# Patient Record
Sex: FEMALE | Race: WHITE | NOT HISPANIC OR LATINO | Employment: FULL TIME | URBAN - METROPOLITAN AREA
[De-identification: names, ages, dates, MRNs, and addresses within clinical notes are randomized per-mention and may not be internally consistent; named-entity substitution may affect disease eponyms.]

---

## 2018-12-17 ENCOUNTER — HISTORICAL (OUTPATIENT)
Dept: ADMINISTRATIVE | Facility: HOSPITAL | Age: 33
End: 2018-12-17

## 2018-12-17 LAB
ABS NEUT (OLG): 6.85 X10(3)/MCL (ref 2.1–9.2)
ALBUMIN SERPL-MCNC: 3.9 GM/DL (ref 3.4–5)
ALBUMIN/GLOB SERPL: 1.1 {RATIO}
ALP SERPL-CCNC: 89 UNIT/L (ref 38–126)
ALT SERPL-CCNC: 17 UNIT/L (ref 12–78)
APTT PPP: 30.9 SECOND(S) (ref 24.8–36.9)
AST SERPL-CCNC: 17 UNIT/L (ref 15–37)
BASOPHILS # BLD AUTO: 0 X10(3)/MCL (ref 0–0.2)
BASOPHILS NFR BLD AUTO: 0.2 %
BILIRUB SERPL-MCNC: 0.8 MG/DL (ref 0.2–1)
BILIRUBIN DIRECT+TOT PNL SERPL-MCNC: 0.2 MG/DL (ref 0–0.2)
BILIRUBIN DIRECT+TOT PNL SERPL-MCNC: 0.6 MG/DL (ref 0–0.8)
BUN SERPL-MCNC: 15 MG/DL (ref 7–18)
CALCIUM SERPL-MCNC: 9.2 MG/DL (ref 8.5–10.1)
CHLORIDE SERPL-SCNC: 103 MMOL/L (ref 98–107)
CO2 SERPL-SCNC: 25 MMOL/L (ref 21–32)
CREAT SERPL-MCNC: 0.84 MG/DL (ref 0.55–1.02)
EOSINOPHIL # BLD AUTO: 0.1 X10(3)/MCL (ref 0–0.9)
EOSINOPHIL NFR BLD AUTO: 0.6 %
ERYTHROCYTE [DISTWIDTH] IN BLOOD BY AUTOMATED COUNT: 14 % (ref 11.5–17)
GLOBULIN SER-MCNC: 3.5 GM/DL (ref 2.4–3.5)
GLUCOSE SERPL-MCNC: 85 MG/DL (ref 74–106)
HCT VFR BLD AUTO: 46.6 % (ref 37–47)
HGB BLD-MCNC: 14.7 GM/DL (ref 12–16)
INR PPP: 1 (ref 0–1.3)
LYMPHOCYTES # BLD AUTO: 2.5 X10(3)/MCL (ref 0.6–4.6)
LYMPHOCYTES NFR BLD AUTO: 24.9 %
MCH RBC QN AUTO: 28.6 PG (ref 27–31)
MCHC RBC AUTO-ENTMCNC: 31.5 GM/DL (ref 33–36)
MCV RBC AUTO: 90.7 FL (ref 80–94)
MONOCYTES # BLD AUTO: 0.6 X10(3)/MCL (ref 0.1–1.3)
MONOCYTES NFR BLD AUTO: 6.3 %
NEUTROPHILS # BLD AUTO: 6.8 X10(3)/MCL (ref 2.1–9.2)
NEUTROPHILS NFR BLD AUTO: 67.9 %
PLATELET # BLD AUTO: 270 X10(3)/MCL (ref 130–400)
PMV BLD AUTO: 10.5 FL (ref 9.4–12.4)
POTASSIUM SERPL-SCNC: 4.6 MMOL/L (ref 3.5–5.1)
PROT SERPL-MCNC: 7.4 GM/DL (ref 6.4–8.2)
PROTHROMBIN TIME: 13.4 SECOND(S) (ref 12.2–14.7)
RBC # BLD AUTO: 5.14 X10(6)/MCL (ref 4.2–5.4)
SODIUM SERPL-SCNC: 137 MMOL/L (ref 136–145)
WBC # SPEC AUTO: 10.1 X10(3)/MCL (ref 4.5–11.5)

## 2021-08-18 ENCOUNTER — HISTORICAL (OUTPATIENT)
Dept: ADMINISTRATIVE | Facility: HOSPITAL | Age: 36
End: 2021-08-18

## 2021-08-18 LAB
ABS NEUT (OLG): 5.1 X10(3)/MCL (ref 2.1–9.2)
ALBUMIN SERPL-MCNC: 4.5 GM/DL (ref 3.4–5)
ALBUMIN/GLOB SERPL: 1.8 {RATIO} (ref 1.5–2.5)
ALP SERPL-CCNC: 70 UNIT/L (ref 38–126)
ALT SERPL-CCNC: 18 UNIT/L (ref 7–52)
APPEARANCE, UA: CLEAR
AST SERPL-CCNC: 19 UNIT/L (ref 15–37)
BACTERIA #/AREA URNS AUTO: ABNORMAL /HPF
BILIRUB SERPL-MCNC: 0.6 MG/DL (ref 0.2–1)
BILIRUB UR QL STRIP: NEGATIVE MG/DL
BILIRUBIN DIRECT+TOT PNL SERPL-MCNC: 0.1 MG/DL (ref 0–0.5)
BILIRUBIN DIRECT+TOT PNL SERPL-MCNC: 0.5 MG/DL
BUN SERPL-MCNC: 18 MG/DL (ref 7–18)
CALCIUM SERPL-MCNC: 9.4 MG/DL (ref 8.5–10)
CHLORIDE SERPL-SCNC: 100 MMOL/L (ref 98–107)
CHOLEST SERPL-MCNC: 231 MG/DL (ref 0–200)
CHOLEST/HDLC SERPL: 2.6 {RATIO}
CO2 SERPL-SCNC: 26 MMOL/L (ref 21–32)
COLOR UR: YELLOW
CREAT SERPL-MCNC: 0.67 MG/DL (ref 0.6–1.3)
ERYTHROCYTE [DISTWIDTH] IN BLOOD BY AUTOMATED COUNT: 14.9 % (ref 11.5–17)
GLOBULIN SER-MCNC: 2.5 GM/DL (ref 1.2–3)
GLUCOSE (UA): NEGATIVE MG/DL
GLUCOSE SERPL-MCNC: 91 MG/DL (ref 74–106)
HCT VFR BLD AUTO: 41.5 % (ref 37–47)
HDLC SERPL-MCNC: 89 MG/DL (ref 35–60)
HGB BLD-MCNC: 13.5 GM/DL (ref 12–16)
HGB UR QL STRIP: ABNORMAL UNIT/L
KETONES UR QL STRIP: ABNORMAL MG/DL
LDLC SERPL CALC-MCNC: 99 MG/DL (ref 0–129)
LEUKOCYTE ESTERASE UR QL STRIP: NEGATIVE UNIT/L
LYMPHOCYTES # BLD AUTO: 1.8 X10(3)/MCL (ref 0.6–3.4)
LYMPHOCYTES NFR BLD AUTO: 23.2 % (ref 13–40)
MCH RBC QN AUTO: 29.8 PG (ref 27–31.2)
MCHC RBC AUTO-ENTMCNC: 32 GM/DL (ref 32–36)
MCV RBC AUTO: 92 FL (ref 80–94)
MONOCYTES # BLD AUTO: 0.8 X10(3)/MCL (ref 0.1–1.3)
MONOCYTES NFR BLD AUTO: 10.7 % (ref 0.1–24)
NEUTROPHILS NFR BLD AUTO: 66.1 % (ref 47–80)
NITRITE UR QL STRIP.AUTO: NEGATIVE
PH UR STRIP: 5.5 [PH]
PLATELET # BLD AUTO: 281 X10(3)/MCL (ref 130–400)
PMV BLD AUTO: 10 FL (ref 9.4–12.4)
POTASSIUM SERPL-SCNC: 4.2 MMOL/L (ref 3.5–5.1)
PROT SERPL-MCNC: 7 GM/DL (ref 6.4–8.2)
PROT UR QL STRIP: NEGATIVE MG/DL
RBC # BLD AUTO: 4.53 X10(6)/MCL (ref 4.2–5.4)
RBC #/AREA URNS HPF: ABNORMAL /HPF
SODIUM SERPL-SCNC: 139 MMOL/L (ref 136–145)
SP GR UR STRIP: >1.03
SQUAMOUS EPITHELIAL, UA: ABNORMAL /LPF
TRIGL SERPL-MCNC: 62 MG/DL (ref 30–150)
UROBILINOGEN UR STRIP-ACNC: 1 MG/DL
VLDLC SERPL CALC-MCNC: 12.4 MG/DL
WBC # SPEC AUTO: 7.7 X10(3)/MCL (ref 4.5–11.5)
WBC #/AREA URNS AUTO: ABNORMAL /[HPF]

## 2021-08-19 ENCOUNTER — HISTORICAL (OUTPATIENT)
Dept: ADMINISTRATIVE | Facility: HOSPITAL | Age: 36
End: 2021-08-19

## 2021-08-19 LAB — SARS-COV-2 RNA RESP QL NAA+PROBE: POSITIVE

## 2021-08-21 LAB — FINAL CULTURE: NORMAL

## 2022-04-10 ENCOUNTER — HISTORICAL (OUTPATIENT)
Dept: ADMINISTRATIVE | Facility: HOSPITAL | Age: 37
End: 2022-04-10

## 2022-04-11 ENCOUNTER — HISTORICAL (OUTPATIENT)
Dept: ADMINISTRATIVE | Facility: HOSPITAL | Age: 37
End: 2022-04-11

## 2022-04-28 VITALS
OXYGEN SATURATION: 99 % | WEIGHT: 180.75 LBS | DIASTOLIC BLOOD PRESSURE: 88 MMHG | HEIGHT: 66 IN | SYSTOLIC BLOOD PRESSURE: 134 MMHG | BODY MASS INDEX: 29.05 KG/M2

## 2022-04-28 VITALS
WEIGHT: 180.75 LBS | DIASTOLIC BLOOD PRESSURE: 88 MMHG | HEIGHT: 66 IN | SYSTOLIC BLOOD PRESSURE: 134 MMHG | OXYGEN SATURATION: 99 % | BODY MASS INDEX: 29.05 KG/M2

## 2023-05-09 PROBLEM — Z00.00 WELLNESS EXAMINATION: Status: ACTIVE | Noted: 2023-05-09

## 2023-05-10 PROBLEM — E78.5 HYPERLIPIDEMIA: Status: ACTIVE | Noted: 2023-05-10

## 2023-08-14 PROBLEM — Z00.00 WELLNESS EXAMINATION: Status: RESOLVED | Noted: 2023-05-09 | Resolved: 2023-08-14

## 2024-02-19 ENCOUNTER — TELEPHONE (OUTPATIENT)
Dept: PRIMARY CARE CLINIC | Facility: CLINIC | Age: 39
End: 2024-02-19

## 2024-02-19 DIAGNOSIS — Z00.00 WELLNESS EXAMINATION: ICD-10-CM

## 2024-02-19 DIAGNOSIS — E78.5 HYPERLIPIDEMIA, UNSPECIFIED HYPERLIPIDEMIA TYPE: Primary | ICD-10-CM

## 2024-02-19 NOTE — TELEPHONE ENCOUNTER
----- Message from Mali Lawson sent at 2/19/2024 11:02 AM CST -----  .Caller is requesting to schedule their Lab appointment prior to annual appointment.  Order is not listed in EPIC.  Please enter order and contact patient to schedule.    Name of Caller: pt      Preferred Date and Time of Labs: 5-10-24    Date of EPP Appointment: 5-22-24    Where would they like the lab performed? [facility]     Would the patient rather a call back or a response via [facility]?  Seun      Best Call Back Number: [phone]    Additional Information: please put labs in system thanks

## 2024-05-21 ENCOUNTER — LAB VISIT (OUTPATIENT)
Dept: LAB | Facility: HOSPITAL | Age: 39
End: 2024-05-21
Attending: FAMILY MEDICINE
Payer: COMMERCIAL

## 2024-05-21 DIAGNOSIS — E78.5 HYPERLIPIDEMIA, UNSPECIFIED HYPERLIPIDEMIA TYPE: ICD-10-CM

## 2024-05-21 DIAGNOSIS — Z00.00 WELLNESS EXAMINATION: ICD-10-CM

## 2024-05-21 LAB
ALBUMIN SERPL-MCNC: 3.8 G/DL (ref 3.5–5)
ALBUMIN/GLOB SERPL: 1.4 RATIO (ref 1.1–2)
ALP SERPL-CCNC: 76 UNIT/L (ref 40–150)
ALT SERPL-CCNC: 13 UNIT/L (ref 0–55)
ANION GAP SERPL CALC-SCNC: 9 MEQ/L
AST SERPL-CCNC: 16 UNIT/L (ref 5–34)
BASOPHILS # BLD AUTO: 0.03 X10(3)/MCL
BASOPHILS NFR BLD AUTO: 0.5 %
BILIRUB SERPL-MCNC: 0.7 MG/DL
BUN SERPL-MCNC: 17.3 MG/DL (ref 7–18.7)
CALCIUM SERPL-MCNC: 8.8 MG/DL (ref 8.4–10.2)
CHLORIDE SERPL-SCNC: 105 MMOL/L (ref 98–107)
CHOLEST SERPL-MCNC: 206 MG/DL
CHOLEST/HDLC SERPL: 3 {RATIO} (ref 0–5)
CO2 SERPL-SCNC: 25 MMOL/L (ref 22–29)
CREAT SERPL-MCNC: 0.77 MG/DL (ref 0.55–1.02)
CREAT/UREA NIT SERPL: 22
EOSINOPHIL # BLD AUTO: 0.09 X10(3)/MCL (ref 0–0.9)
EOSINOPHIL NFR BLD AUTO: 1.5 %
ERYTHROCYTE [DISTWIDTH] IN BLOOD BY AUTOMATED COUNT: 14.6 % (ref 11.5–17)
EST. AVERAGE GLUCOSE BLD GHB EST-MCNC: 93.9 MG/DL
GFR SERPLBLD CREATININE-BSD FMLA CKD-EPI: >60 ML/MIN/1.73/M2
GLOBULIN SER-MCNC: 2.8 GM/DL (ref 2.4–3.5)
GLUCOSE SERPL-MCNC: 90 MG/DL (ref 74–100)
HBA1C MFR BLD: 4.9 %
HCT VFR BLD AUTO: 39.9 % (ref 37–47)
HDLC SERPL-MCNC: 73 MG/DL (ref 35–60)
HGB BLD-MCNC: 12.9 G/DL (ref 12–16)
IMM GRANULOCYTES # BLD AUTO: 0.01 X10(3)/MCL (ref 0–0.04)
IMM GRANULOCYTES NFR BLD AUTO: 0.2 %
LDLC SERPL CALC-MCNC: 116 MG/DL (ref 50–140)
LYMPHOCYTES # BLD AUTO: 1.91 X10(3)/MCL (ref 0.6–4.6)
LYMPHOCYTES NFR BLD AUTO: 31.9 %
MCH RBC QN AUTO: 30.2 PG (ref 27–31)
MCHC RBC AUTO-ENTMCNC: 32.3 G/DL (ref 33–36)
MCV RBC AUTO: 93.4 FL (ref 80–94)
MONOCYTES # BLD AUTO: 0.51 X10(3)/MCL (ref 0.1–1.3)
MONOCYTES NFR BLD AUTO: 8.5 %
NEUTROPHILS # BLD AUTO: 3.43 X10(3)/MCL (ref 2.1–9.2)
NEUTROPHILS NFR BLD AUTO: 57.4 %
NRBC BLD AUTO-RTO: 0 %
PLATELET # BLD AUTO: 317 X10(3)/MCL (ref 130–400)
PMV BLD AUTO: 11.1 FL (ref 7.4–10.4)
POTASSIUM SERPL-SCNC: 4.4 MMOL/L (ref 3.5–5.1)
PROT SERPL-MCNC: 6.6 GM/DL (ref 6.4–8.3)
RBC # BLD AUTO: 4.27 X10(6)/MCL (ref 4.2–5.4)
SODIUM SERPL-SCNC: 139 MMOL/L (ref 136–145)
TRIGL SERPL-MCNC: 83 MG/DL (ref 37–140)
TSH SERPL-ACNC: 1.48 UIU/ML (ref 0.35–4.94)
VLDLC SERPL CALC-MCNC: 17 MG/DL
WBC # SPEC AUTO: 5.98 X10(3)/MCL (ref 4.5–11.5)

## 2024-05-21 PROCEDURE — 80053 COMPREHEN METABOLIC PANEL: CPT

## 2024-05-21 PROCEDURE — 83036 HEMOGLOBIN GLYCOSYLATED A1C: CPT

## 2024-05-21 PROCEDURE — 85025 COMPLETE CBC W/AUTO DIFF WBC: CPT

## 2024-05-21 PROCEDURE — 80061 LIPID PANEL: CPT

## 2024-05-21 PROCEDURE — 36415 COLL VENOUS BLD VENIPUNCTURE: CPT

## 2024-05-21 PROCEDURE — 84443 ASSAY THYROID STIM HORMONE: CPT

## 2024-05-22 PROBLEM — E78.49 OTHER HYPERLIPIDEMIA: Status: ACTIVE | Noted: 2023-05-10

## 2024-05-22 NOTE — PROGRESS NOTES
..Annual Exam (Wants to speak about losing weight)       HISTORY OF PRESENT ILLNESS    This 38 y.o. female patient presents to the clinic today for a wellness CPX.  She has been feeling well.  She has not been sleeping well secondary to 2 year old not sleeping well.   Appetite is normal.   She is an  at [employer].  She is  with 3 children.  She has alcohol 2-3 times a week.  No tobacco.   She has up to 1-2 cups of coffee and 1 diet soda a day.  She walks around the neighborhood 2-3 times a week.  GYN: Velma MANCILLA; last checkup 09/19/2023.    The patient's Health Maintenance was reviewed and the following appears to be due at this time:   Health Maintenance Due   Topic Date Due    Hepatitis C Screening  Never done    HIV Screening  Never done    COVID-19 Vaccine (2 - 2023-24 season) 09/01/2023       ..History reviewed. No pertinent past medical history.       ..  Past Surgical History:   Procedure Laterality Date    DILATION AND CURETTAGE OF UTERUS  2013          No current outpatient medications        ..  Social History     Socioeconomic History    Marital status:     Number of children: 3   Occupational History    Occupation:  at [employer]   Tobacco Use    Smoking status: Never     Passive exposure: Never    Smokeless tobacco: Never   Substance and Sexual Activity    Alcohol use: Yes     Comment: 2-3 times per week    Drug use: Never    Sexual activity: Yes     Partners: Male     Social Determinants of Health     Financial Resource Strain: Low Risk  (5/24/2024)    Overall Financial Resource Strain (CARDIA)     Difficulty of Paying Living Expenses: Not hard at all   Food Insecurity: No Food Insecurity (5/24/2024)    Hunger Vital Sign     Worried About Running Out of Food in the Last Year: Never true     Ran Out of Food in the Last Year: Never true   Transportation Needs: No Transportation Needs (5/24/2024)    TRANSPORTATION NEEDS      Transportation : No   Physical Activity: Insufficiently Active (5/24/2024)    Exercise Vital Sign     Days of Exercise per Week: 2 days     Minutes of Exercise per Session: 30 min   Stress: No Stress Concern Present (5/24/2024)    [employer] - Occupational Stress Questionnaire     Feeling of Stress : Only a little   Housing Stability: Low Risk  (5/24/2024)    Housing Stability Vital Sign     Unable to Pay for Housing in the Last Year: No     Homeless in the Last Year: No          ..  Family History   Problem Relation Name Age of Onset    Cancer Mother      Heart disease Mother      Hypertension Mother      Hyperlipidemia Mother      Diabetes Mother      Colon cancer Mother  68    Hypertension Father      Hyperlipidemia Father      Diabetes Father      Cancer Sister      Breast cancer Sister  42    Lupus Sister      No Known Problems Brother      No Known Problems Brother      Cancer Brother  27        Sinus Cancer          ..  Review of patient's allergies indicates:   Allergen Reactions    Ceclor [cefaclor] Rash     Taken as a toddler          ..  Immunization History   Administered Date(s) Administered    COVID-19, vector-nr, rS-Ad26, PF ([employer]) 03/19/2021    Tdap 01/14/2015, 01/01/2021          REVIEW OF SYSTEMS:    GENERAL:   no unexplained wt gain/loss,  fever, + fatigue, chills, night sweats or weakness  HEENT: no sore throat, ear pain, sinus pressure, nasal congestion, or rhinorrhea  VISION: no vision changes, glaucoma, cataracts, + glasses, rarely wears contacts  CARDIAC: no chest pain, palpitations, dyspnea on exertion, orthopnea  RESPIRATORY: no cough, wheezing, sputum production, or SOB  GI: no abdominal pain, n&v, + heartburn, 3-4 x week, does not eat late, takes TUMS, + constipation x 2 weeks, takes a stool softener or a laxative, diarrhea,  blood in stool or  (+)family history of colon cancer:  mother, maternal grandfather  : no dysuria, hematuria, frequency,  urgency,  "incontinence,  vaginal discharge,  abnormal vaginal bleeding  MUSC/SKEL:  no myalgia, weakness, edema,  arthralgia, or joint effusion  SKIN:  + rashes on face, hives, itching or sores  NEURO:  No headaches, numbness,  tingling, weakness, or dizziness  PSYCH:  + anxiety,  depression,  irritability,  suicidal ideation or hallucinations  ENDO:  No polyuria, polydipsia,  polyphagia  HEME:  No bruising,  lymphadenopathy, bleeding disorders, no anemia       PHYSICAL EXAM:    Visit Vitals  /88   Pulse 94   Temp 98.5 °F (36.9 °C)   Ht 5' 6" (1.676 m)   Wt 79.3 kg (174 lb 12.8 oz)   LMP 05/17/2024 (Exact Date)   SpO2 98%   BMI 28.21 kg/m²       GENERAL:  Well-developed well-nourished white female in NAD, alert and oriented x 3  SKIN:  no rash or abnormal appearing skin lesions  HEENT:  PERRLA, EOMI, mouth wnl, throat wnl, EAC and TM wnl bilaterally  NECK:  FROM, no lymphadenopathy, no thyroid abnormalities palpable  CHEST:  CTA bilaterally no wheezes, crackles or rubs  CARDIAC:  RRR, no murmurs audible  ABDOMEN:  Soft, nontender, nondistended, NBSx4, no rebound or guarding, no HSM  EXTREMITIES:  no clubbing, cyanosis, or edema.  joints wnl. +2 DP/PT pulse bilaterally  NEURO:  no sensory or motor deficits noted. CN II-XII intact. Gait wnl.           ASSESSMENT AND PLAN     1. Wellness examination  Overview:  CBC, CMP, Lipids ordered today.   GYN: Dr Velma MANCILLA; requesting PAP.     05/24/2024: Patient presents for wellness examination.    She has been feeling well.    Patient reports fatigue; she does not resting in a few times.    Patient encouraged to make healthy food choices and limit portions.  Patient encouraged to increase physical activity, exercise and lose weight.    Gyn: Dr. Carreon; last checkup 09/2023.  Labs reviewed with patient.        2. Other hyperlipidemia  Overview:  Lipids today.  Decrease cholesterol, fat in diet.    05/24/2024:  Total cholesterol 206, HDL 73, triglycerides 83 and .      3. " Fatigue, unspecified type  Overview:  Probably secondary to not sleeping well; her youngest does not sleeping well.  Labs are normal.  Patient advised to try to get more rest.           ..Follow up in about 1 year (around 5/24/2025) for Wellness.     Future Appointments   Date Time Provider Department Center   5/26/2025  8:30 AM Víctor Doshi MD [facility] [place]

## 2024-05-24 ENCOUNTER — OFFICE VISIT (OUTPATIENT)
Dept: PRIMARY CARE CLINIC | Facility: CLINIC | Age: 39
End: 2024-05-24
Payer: COMMERCIAL

## 2024-05-24 VITALS
DIASTOLIC BLOOD PRESSURE: 88 MMHG | HEART RATE: 94 BPM | OXYGEN SATURATION: 98 % | WEIGHT: 174.81 LBS | BODY MASS INDEX: 28.09 KG/M2 | TEMPERATURE: 99 F | HEIGHT: 66 IN | SYSTOLIC BLOOD PRESSURE: 123 MMHG

## 2024-05-24 DIAGNOSIS — R53.83 FATIGUE, UNSPECIFIED TYPE: ICD-10-CM

## 2024-05-24 DIAGNOSIS — E78.49 OTHER HYPERLIPIDEMIA: ICD-10-CM

## 2024-05-24 DIAGNOSIS — Z00.00 WELLNESS EXAMINATION: Primary | ICD-10-CM

## 2024-05-24 PROCEDURE — 3074F SYST BP LT 130 MM HG: CPT | Mod: CPTII,,, | Performed by: FAMILY MEDICINE

## 2024-05-24 PROCEDURE — 1160F RVW MEDS BY RX/DR IN RCRD: CPT | Mod: CPTII,,, | Performed by: FAMILY MEDICINE

## 2024-05-24 PROCEDURE — 1159F MED LIST DOCD IN RCRD: CPT | Mod: CPTII,,, | Performed by: FAMILY MEDICINE

## 2024-05-24 PROCEDURE — 3044F HG A1C LEVEL LT 7.0%: CPT | Mod: CPTII,,, | Performed by: FAMILY MEDICINE

## 2024-05-24 PROCEDURE — 3008F BODY MASS INDEX DOCD: CPT | Mod: CPTII,,, | Performed by: FAMILY MEDICINE

## 2024-05-24 PROCEDURE — 99395 PREV VISIT EST AGE 18-39: CPT | Mod: ,,, | Performed by: FAMILY MEDICINE

## 2024-05-24 PROCEDURE — 3079F DIAST BP 80-89 MM HG: CPT | Mod: CPTII,,, | Performed by: FAMILY MEDICINE

## 2024-08-26 PROBLEM — Z00.00 WELLNESS EXAMINATION: Status: RESOLVED | Noted: 2023-05-09 | Resolved: 2024-08-26

## 2025-01-24 ENCOUNTER — OFFICE VISIT (OUTPATIENT)
Dept: PRIMARY CARE CLINIC | Facility: CLINIC | Age: 40
End: 2025-01-24
Payer: COMMERCIAL

## 2025-01-24 VITALS
DIASTOLIC BLOOD PRESSURE: 102 MMHG | HEIGHT: 66 IN | BODY MASS INDEX: 30.47 KG/M2 | HEART RATE: 90 BPM | SYSTOLIC BLOOD PRESSURE: 145 MMHG | WEIGHT: 189.63 LBS | OXYGEN SATURATION: 96 %

## 2025-01-24 DIAGNOSIS — R42 DIZZINESS: ICD-10-CM

## 2025-01-24 DIAGNOSIS — R03.0 ELEVATED BP WITHOUT DIAGNOSIS OF HYPERTENSION: Primary | ICD-10-CM

## 2025-01-24 PROCEDURE — 99214 OFFICE O/P EST MOD 30 MIN: CPT | Mod: ,,, | Performed by: FAMILY MEDICINE

## 2025-01-24 PROCEDURE — 1160F RVW MEDS BY RX/DR IN RCRD: CPT | Mod: CPTII,,, | Performed by: FAMILY MEDICINE

## 2025-01-24 PROCEDURE — 3080F DIAST BP >= 90 MM HG: CPT | Mod: CPTII,,, | Performed by: FAMILY MEDICINE

## 2025-01-24 PROCEDURE — 1159F MED LIST DOCD IN RCRD: CPT | Mod: CPTII,,, | Performed by: FAMILY MEDICINE

## 2025-01-24 PROCEDURE — 3077F SYST BP >= 140 MM HG: CPT | Mod: CPTII,,, | Performed by: FAMILY MEDICINE

## 2025-01-24 PROCEDURE — 3008F BODY MASS INDEX DOCD: CPT | Mod: CPTII,,, | Performed by: FAMILY MEDICINE

## 2025-01-24 RX ORDER — MECLIZINE HYDROCHLORIDE 25 MG/1
25 TABLET ORAL 3 TIMES DAILY PRN
Qty: 12 TABLET | Refills: 1 | Status: SHIPPED | OUTPATIENT
Start: 2025-01-24 | End: 2025-02-17

## 2025-01-24 RX ORDER — METOPROLOL SUCCINATE 25 MG/1
25 TABLET, EXTENDED RELEASE ORAL DAILY
Qty: 90 TABLET | Refills: 3 | Status: SHIPPED | OUTPATIENT
Start: 2025-01-24 | End: 2026-01-24

## 2025-01-24 NOTE — PROGRESS NOTES
"  Family Medicine    Patient ID: [id number]     Chief Complaint: Hypertension (Patient has been having issues with HTN, causing dizziness since Saturday, has been checking BP at home with the average dystolic being 85-95 consistantly, systolic 135-145 )      HPI:     Hui Lewis is a 39 y.o. female here today for dizziness for about 6 days with elevated bp.  Noticed when turning over in bed.  Feels like she is in a "bell".  Goes away after about a minute.  Occ feeling "off" when standing first thing in the morning.  No dizziness when walking or getting up now.  No notable change in fluid intake.  No CP, no palpitations, no HA, no extremity weakness.   BP elevated this week.  No hx of HTN.    Reviewed labs done in the last year, no concerning findings.     History reviewed. No pertinent past medical history.     Past Surgical History:   Procedure Laterality Date    DILATION AND CURETTAGE OF UTERUS  2013        Social History     Tobacco Use    Smoking status: Never     Passive exposure: Never    Smokeless tobacco: Never   Substance and Sexual Activity    Alcohol use: Yes     Alcohol/week: 4.0 standard drinks of alcohol     Types: 4 Shots of liquor per week     Comment: 2-3 times per week    Drug use: Never    Sexual activity: Yes     Partners: Male     Birth control/protection: None        Current Outpatient Medications   Medication Instructions    meclizine (ANTIVERT) 25 mg, Oral, 3 times daily PRN    metoprolol succinate (TOPROL-XL) 25 mg, Oral, Daily       Review of patient's allergies indicates:   Allergen Reactions    Ceclor [cefaclor] Rash     Taken as a toddler        Patient Care Team:  Víctor Doshi MD as PCP - General (Family Medicine)  Fatoumata Carreon MD as Consulting Physician (Obstetrics and Gynecology)     Subjective:     Review of Systems    12 point review of systems conducted, negative except as stated in the history of present illness. See HPI for details.    Objective:     Visit Vitals  BP " "(!) 145/102   Pulse 90   Ht 5' 6" (1.676 m)   Wt 86 kg (189 lb 9.6 oz)   SpO2 96%   Breastfeeding No   BMI 30.60 kg/m²       Physical Exam  Vitals and nursing note reviewed.   Constitutional:       Appearance: Normal appearance.   HENT:      Mouth/Throat:      Mouth: Mucous membranes are moist.   Eyes:      Pupils: Pupils are equal, round, and reactive to light.   Cardiovascular:      Rate and Rhythm: Normal rate and regular rhythm.   Pulmonary:      Effort: Pulmonary effort is normal.      Breath sounds: Normal breath sounds.   Neurological:      Mental Status: She is alert and oriented to person, place, and time.      Cranial Nerves: No cranial nerve deficit.      Motor: No weakness.      Gait: Gait normal.      Comments: Neg [place] Halpike B, neg pronator drift.    Psychiatric:         Mood and Affect: Mood normal.         Labs Reviewed:     Chemistry:  Lab Results   Component Value Date     05/21/2024    K 4.4 05/21/2024    BUN 17.3 05/21/2024    CREATININE 0.77 05/21/2024    EGFRNORACEVR >60 05/21/2024    GLUCOSE 90 05/21/2024    CALCIUM 8.8 05/21/2024    ALKPHOS 76 05/21/2024    LABPROT 6.6 05/21/2024    ALBUMIN 3.8 05/21/2024    BILIDIR 0.2 05/10/2023    IBILI 0.5 08/18/2021    AST 16 05/21/2024    ALT 13 05/21/2024    TSH 1.483 05/21/2024        Lab Results   Component Value Date    HGBA1C 4.9 05/21/2024        Hematology:  Lab Results   Component Value Date    WBC 5.98 05/21/2024    HGB 12.9 05/21/2024    HCT 39.9 05/21/2024     05/21/2024       Lipid Panel:  Lab Results   Component Value Date    CHOL 206 (H) 05/21/2024    HDL 73 (H) 05/21/2024    .00 05/21/2024    TRIG 83 05/21/2024    TOTALCHOLEST 3 05/21/2024        Urine:  Lab Results   Component Value Date    APPEARANCEUA Clear 08/18/2021    PROTEINUA Negative 08/18/2021    LEUKOCYTESUR Negative 08/18/2021    RBCUA 4-5 (A) 08/18/2021    WBCUA 0-2 08/18/2021    BACTERIA None Seen 08/18/2021        Assessment:       ICD-10-CM ICD-9-CM "   1. Elevated BP without diagnosis of hypertension  R03.0 796.2   2. Dizziness  R42 780.4        Plan:     1. Elevated BP without diagnosis of hypertension  Overview:  Keep log.  Trial of metoprolol 25 mg once a day.  ER for chest pain or worsening symptoms.     Labs ordered to check for secondary causes of hypertension.     Orders:  -     metoprolol succinate (TOPROL-XL) 25 MG 24 hr tablet; Take 1 tablet (25 mg total) by mouth once daily.  Dispense: 90 tablet; Refill: 3  -     CBC Auto Differential; Future; Expected date: 01/24/2025  -     Comprehensive Metabolic Panel; Future; Expected date: 01/24/2025  -     TSH; Future; Expected date: 01/24/2025    2. Dizziness  Assessment & Plan:  Force fluids.  Trial of meclizine.      Orders:  -     meclizine (ANTIVERT) 25 mg tablet; Take 1 tablet (25 mg total) by mouth 3 (three) times daily as needed for Dizziness.  Dispense: 12 tablet; Refill: 1  -     CBC Auto Differential; Future; Expected date: 01/24/2025  -     Comprehensive Metabolic Panel; Future; Expected date: 01/24/2025  -     TSH; Future; Expected date: 01/24/2025         Follow up in about 2 weeks (around 2/7/2025), or if symptoms worsen or fail to improve, for Nurse Visit bp check. In addition to their scheduled follow up, the patient has also been instructed to follow up on as needed basis.     Future Appointments   Date Time Provider Department Center   2/7/2025  8:30 AM NURSE, [facility] PRIMARY CARE [facility] [place]    5/26/2025  8:30 AM Víctor Doshi MD [facility] [place] MD

## 2025-01-31 ENCOUNTER — LAB VISIT (OUTPATIENT)
Dept: LAB | Facility: HOSPITAL | Age: 40
End: 2025-01-31
Attending: FAMILY MEDICINE
Payer: COMMERCIAL

## 2025-01-31 DIAGNOSIS — R42 DIZZINESS: ICD-10-CM

## 2025-01-31 DIAGNOSIS — R03.0 ELEVATED BP WITHOUT DIAGNOSIS OF HYPERTENSION: ICD-10-CM

## 2025-01-31 LAB
ALBUMIN SERPL-MCNC: 3.7 G/DL (ref 3.5–5)
ALBUMIN/GLOB SERPL: 1.3 RATIO (ref 1.1–2)
ALP SERPL-CCNC: 83 UNIT/L (ref 40–150)
ALT SERPL-CCNC: 15 UNIT/L (ref 0–55)
ANION GAP SERPL CALC-SCNC: 7 MEQ/L
AST SERPL-CCNC: 19 UNIT/L (ref 5–34)
BASOPHILS # BLD AUTO: 0.03 X10(3)/MCL
BASOPHILS NFR BLD AUTO: 0.5 %
BILIRUB SERPL-MCNC: 0.6 MG/DL
BUN SERPL-MCNC: 13.2 MG/DL (ref 7–18.7)
CALCIUM SERPL-MCNC: 9 MG/DL (ref 8.4–10.2)
CHLORIDE SERPL-SCNC: 107 MMOL/L (ref 98–107)
CO2 SERPL-SCNC: 24 MMOL/L (ref 22–29)
CREAT SERPL-MCNC: 0.95 MG/DL (ref 0.55–1.02)
CREAT/UREA NIT SERPL: 14
EOSINOPHIL # BLD AUTO: 0.1 X10(3)/MCL (ref 0–0.9)
EOSINOPHIL NFR BLD AUTO: 1.5 %
ERYTHROCYTE [DISTWIDTH] IN BLOOD BY AUTOMATED COUNT: 13.6 % (ref 11.5–17)
GFR SERPLBLD CREATININE-BSD FMLA CKD-EPI: >60 ML/MIN/1.73/M2
GLOBULIN SER-MCNC: 2.8 GM/DL (ref 2.4–3.5)
GLUCOSE SERPL-MCNC: 100 MG/DL (ref 74–100)
HCT VFR BLD AUTO: 41.8 % (ref 37–47)
HGB BLD-MCNC: 13.6 G/DL (ref 12–16)
IMM GRANULOCYTES # BLD AUTO: 0.01 X10(3)/MCL (ref 0–0.04)
IMM GRANULOCYTES NFR BLD AUTO: 0.2 %
LYMPHOCYTES # BLD AUTO: 1.84 X10(3)/MCL (ref 0.6–4.6)
LYMPHOCYTES NFR BLD AUTO: 28.4 %
MCH RBC QN AUTO: 29.8 PG (ref 27–31)
MCHC RBC AUTO-ENTMCNC: 32.5 G/DL (ref 33–36)
MCV RBC AUTO: 91.7 FL (ref 80–94)
MONOCYTES # BLD AUTO: 0.54 X10(3)/MCL (ref 0.1–1.3)
MONOCYTES NFR BLD AUTO: 8.3 %
NEUTROPHILS # BLD AUTO: 3.95 X10(3)/MCL (ref 2.1–9.2)
NEUTROPHILS NFR BLD AUTO: 61.1 %
NRBC BLD AUTO-RTO: 0 %
PLATELET # BLD AUTO: 326 X10(3)/MCL (ref 130–400)
PMV BLD AUTO: 11.3 FL (ref 7.4–10.4)
POTASSIUM SERPL-SCNC: 3.9 MMOL/L (ref 3.5–5.1)
PROT SERPL-MCNC: 6.5 GM/DL (ref 6.4–8.3)
RBC # BLD AUTO: 4.56 X10(6)/MCL (ref 4.2–5.4)
SODIUM SERPL-SCNC: 138 MMOL/L (ref 136–145)
TSH SERPL-ACNC: 1.38 UIU/ML (ref 0.35–4.94)
WBC # BLD AUTO: 6.47 X10(3)/MCL (ref 4.5–11.5)

## 2025-01-31 PROCEDURE — 84443 ASSAY THYROID STIM HORMONE: CPT

## 2025-01-31 PROCEDURE — 85025 COMPLETE CBC W/AUTO DIFF WBC: CPT

## 2025-01-31 PROCEDURE — 80053 COMPREHEN METABOLIC PANEL: CPT

## 2025-01-31 PROCEDURE — 36415 COLL VENOUS BLD VENIPUNCTURE: CPT

## 2025-02-07 ENCOUNTER — CLINICAL SUPPORT (OUTPATIENT)
Dept: PRIMARY CARE CLINIC | Facility: CLINIC | Age: 40
End: 2025-02-07
Payer: COMMERCIAL

## 2025-02-07 VITALS — SYSTOLIC BLOOD PRESSURE: 116 MMHG | DIASTOLIC BLOOD PRESSURE: 76 MMHG

## 2025-02-07 DIAGNOSIS — R03.0 ELEVATED BP WITHOUT DIAGNOSIS OF HYPERTENSION: Primary | ICD-10-CM

## 2025-07-01 ENCOUNTER — TELEPHONE (OUTPATIENT)
Dept: PRIMARY CARE CLINIC | Facility: CLINIC | Age: 40
End: 2025-07-01
Payer: COMMERCIAL

## 2025-07-01 DIAGNOSIS — Z00.00 WELLNESS EXAMINATION: Primary | ICD-10-CM

## 2025-07-01 NOTE — TELEPHONE ENCOUNTER
Copied from CRM #[id number]. Topic: Appointments - Amb Referral  >> Jul 1, 2025 11:08 AM Devin wrote:  Who Called: Hui Lewis    What order is the patient requesting: wellness labs   When does the expect the orders to be performed? AIS        Preferred Method of Contact: Phone Call  Patient's Preferred Phone Number on File: [phone]   Best Call Back Number, if different:  Additional Information: pt appt for wellness is 7/16/25 and requesting labs pls advise

## 2025-07-11 NOTE — PROGRESS NOTES
..Annual Exam       HISTORY OF PRESENT ILLNESS    This 39 y.o. female patient presented to the clinic today for a wellness CPX.  She has been feeling well.  She has not been sleeping well secondary to 3 year old sleeping with her.   Appetite is normal. She has decreased her calories.  She is an  at [employer].  She is  with 3 children.  She has alcohol 2-3 times a week.  No tobacco.   She has up to 1 cup of coffee and may have 1 diet soda a day.  She walks around the neighborhood 4-5 times a week.  GYN: Velma MANCILLA; last checkup 11/01/2024. Had mammogram earlier this year.      The patient's Health Maintenance was reviewed and the following appears to be due at this time:   Health Maintenance Due   Topic Date Due    Hepatitis C Screening  Never done    HIV Screening  Never done    COVID-19 Vaccine (2 - 2024-25 season) 09/01/2024       ..  Past Medical History:   Diagnosis Date    Arthritis           ..  Past Surgical History:   Procedure Laterality Date    DILATION AND CURETTAGE OF UTERUS  2013          Current Outpatient Medications   Medication Instructions    meclizine (ANTIVERT) 25 mg, Oral, 3 times daily PRN    metoprolol succinate (TOPROL-XL) 25 mg, Oral, Daily         ..Social History[1]       ..  Family History   Problem Relation Name Age of Onset    Cancer Mother Mom     Heart disease Mother Mom     Hypertension Mother Mom     Hyperlipidemia Mother Mom     Diabetes Mother Mom     Colon cancer Mother Mom 68    Hypertension Father Dad     Hyperlipidemia Father Dad     Diabetes Father Dad     Alcohol abuse Father Dad     Cancer Sister Claire     Breast cancer Sister Claire 42    Lupus Sister Claire     Alcohol abuse Brother Brother     No Known Problems Brother      Stroke Brother Alexy     Cancer Brother Alexy 27        Sinus Cancer          ..  Review of patient's allergies indicates:   Allergen Reactions    Ceclor [cefaclor] Rash     Taken as a toddler     "      ..  Immunization History   Administered Date(s) Administered    COVID-19, vector-nr, rS-Ad26, PF ([employer]) 03/19/2021    Tdap 01/14/2015, 01/01/2021          REVIEW OF SYSTEMS:    GENERAL:   no unexplained wt gain/loss,  fever, fatigue, chills, night sweats or weakness  HEENT: no sore throat, ear pain, sinus pressure, nasal congestion, or rhinorrhea  VISION: no vision changes, glaucoma, cataracts, + glasses, rarely wears contacts, due for checkup   CARDIAC: no chest pain, palpitations, dyspnea on exertion, orthopnea  RESPIRATORY: no cough, wheezing, sputum production, or SOB  GI: no abdominal pain, n&v, occasional heartburn constipation, diarrhea,  blood in stool or  (+)family history of colon cancer  mother, maternal grandfather  : no dysuria, hematuria, frequency,  urgency, incontinence,  vaginal discharge,  abnormal vaginal bleeding  MUSC/SKEL:  no myalgia, weakness, edema,  + arthralgia: right shoulder, intermittently for years, no joint effusion  SKIN:  No rash, hives, itching or sores  NEURO:  No headaches, numbness,  tingling, weakness, or dizziness  PSYCH:  No anxiety,  depression,  irritability,  suicidal ideation or hallucinations  ENDO:  No polyuria, polydipsia,  polyphagia  HEME:  No bruising,  lymphadenopathy, bleeding disorders, no anemia       PHYSICAL EXAM:    Visit Vitals  /83   Pulse 63   Temp 98.1 °F (36.7 °C)   Ht 5' 6" (1.676 m)   Wt 82.1 kg (181 lb)   LMP 06/28/2025 (Exact Date)   SpO2 98%   BMI 29.21 kg/m²       GENERAL:  Well-developed well-nourished white female in NAD, alert and oriented x 3.  Her affect is appropriate.  SKIN:  no rash or abnormal appearing skin lesions  HEENT:  PERRLA, EOMI, mouth wnl, throat wnl, EAC and TM wnl bilaterally  NECK:  FROM, no lymphadenopathy, no thyroid abnormalities palpable  CHEST:  CTA bilaterally no wheezes, crackles or rubs  CARDIAC:  RRR, no murmurs audible  ABDOMEN:  Soft, nontender, nondistended, NBSx4, no rebound or guarding, no " HSM  EXTREMITIES:  no clubbing, cyanosis, or edema.  joints wnl. +2 DP/PT pulse bilaterally  NEURO:  no sensory or motor deficits noted. CN II-XII intact. Gait wnl.           ASSESSMENT AND PLAN     1. Encounter for wellness examination in adult  Overview:  CBC, CMP, Lipids ordered today.   GYN: Dr Velma MANCILLA; requesting PAP.     05/24/2024: Patient presents for wellness examination.    She has been feeling well.    Patient reports fatigue; she does not sleep well due to 2-year-old sleeping with her.   Patient encouraged to make healthy food choices and limit portions.  Patient encouraged to increase physical activity, exercise and lose weight.    Gyn: Dr. Carreon; last checkup 09/2023.  Labs reviewed with patient.      Assessment & Plan:  Patient presents for wellness examination.  She has been feeling well.    Patient reports fatigue; she does not rest well secondary to 3-year-old sleeping with her.   Patient encouraged to make healthy food choices and limit portions.  Patient encouraged to increase physical activity, exercise and lose weight.    Gyn: Dr. Carreon; last checkup 11/01/2024.  Patient had mammogram earlier this year.  Labs reviewed with patient.      2. Elevated BP without diagnosis of hypertension  Overview:  Keep log.  Trial of metoprolol 25 mg once a day.  ER for chest pain or worsening symptoms.     Labs ordered to check for secondary causes of hypertension.     Assessment & Plan:  Her blood pressure is well controlled; continue Toprol-XL.  Refills sent.    Limit sodium consumption.    Patient encouraged to lose weight.    Continue to walk regularly.    Orders:  -     metoprolol succinate (TOPROL-XL) 25 MG 24 hr tablet; Take 1 tablet (25 mg total) by mouth once daily.  Dispense: 90 tablet; Refill: 3    3. Other hyperlipidemia  Overview:  Lipids today.  Decrease cholesterol, fat in diet.    05/24/2024:  Total cholesterol 206, HDL 73, triglycerides 83 and .      4. Fatigue, unspecified  type  Overview:  Probably secondary to not sleeping well; her youngest does not sleeping well.  Labs are normal.  Patient advised to try to get more rest.    Assessment & Plan:  Patient still reports fatigue; she does not rest well secondary to 3-year-old sleeping with her.  Patient encouraged to get more rest.  Patient encouraged to lose weight.  Continue to walk regularly.      5. Overweight with body mass index (BMI) of 29 to 29.9 in adult  Overview:  Patient encouraged to make healthy food choices and limit portions.    Patient encouraged to limit intake of fried foods, processed foods and sugary foods.    Patient encouraged to increase physical activity, exercise regularly and lose weight.       6. Primary hypertension  Overview:  Keep log.  Trial of metoprolol 25 mg once a day.  ER for chest pain or worsening symptoms.     Labs ordered to check for secondary causes of hypertension.     Assessment & Plan:  Her blood pressure is well controlled; continue Toprol-XL.  Refills sent.    Limit sodium consumption.    Patient encouraged to lose weight.    Continue to walk regularly.      7. Dizziness  Assessment & Plan:  Patient states her dizziness has resolved since starting on blood pressure medication.           ..Follow up in about 1 year (around 7/16/2026) for Wellness, With labwork prior to visit.     No future appointments.                   [1]   Social History  Socioeconomic History    Marital status:     Number of children: 3   Occupational History    Occupation:  at [employer]   Tobacco Use    Smoking status: Never     Passive exposure: Never    Smokeless tobacco: Never   Substance and Sexual Activity    Alcohol use: Yes     Alcohol/week: 4.0 standard drinks of alcohol     Types: 4 Shots of liquor per week     Comment: 2-3 times per week    Drug use: Never    Sexual activity: Yes     Partners: Male     Birth control/protection: None     Social Drivers of Health      Financial Resource Strain: Low Risk  (7/15/2025)    Overall Financial Resource Strain (CARDIA)     Difficulty of Paying Living Expenses: Not hard at all   Food Insecurity: No Food Insecurity (7/15/2025)    Hunger Vital Sign     Worried About Running Out of Food in the Last Year: Never true     Ran Out of Food in the Last Year: Never true   Transportation Needs: No Transportation Needs (7/15/2025)    PRAPARE - Transportation     Lack of Transportation (Medical): No     Lack of Transportation (Non-Medical): No   Physical Activity: Sufficiently Active (7/15/2025)    Exercise Vital Sign     Days of Exercise per Week: 5 days     Minutes of Exercise per Session: 40 min   Stress: No Stress Concern Present (7/15/2025)    [employer] - Occupational Stress Questionnaire     Feeling of Stress : Only a little   Housing Stability: Low Risk  (7/15/2025)    Housing Stability Vital Sign     Unable to Pay for Housing in the Last Year: No     Number of Times Moved in the Last Year: 0     Homeless in the Last Year: No

## 2025-07-14 ENCOUNTER — LAB VISIT (OUTPATIENT)
Dept: LAB | Facility: HOSPITAL | Age: 40
End: 2025-07-14
Attending: FAMILY MEDICINE
Payer: COMMERCIAL

## 2025-07-14 DIAGNOSIS — Z00.00 WELLNESS EXAMINATION: ICD-10-CM

## 2025-07-14 LAB
ALBUMIN SERPL-MCNC: 3.9 G/DL (ref 3.5–5)
ALBUMIN/GLOB SERPL: 1.2 RATIO (ref 1.1–2)
ALP SERPL-CCNC: 81 UNIT/L (ref 40–150)
ALT SERPL-CCNC: 11 UNIT/L (ref 0–55)
ANION GAP SERPL CALC-SCNC: 9 MEQ/L
AST SERPL-CCNC: 16 UNIT/L (ref 11–45)
BACTERIA #/AREA URNS AUTO: ABNORMAL /HPF
BASOPHILS # BLD AUTO: 0.04 X10(3)/MCL
BASOPHILS NFR BLD AUTO: 0.6 %
BILIRUB SERPL-MCNC: 0.6 MG/DL
BILIRUB UR QL STRIP.AUTO: NEGATIVE
BUN SERPL-MCNC: 14.7 MG/DL (ref 7–18.7)
CALCIUM SERPL-MCNC: 9.1 MG/DL (ref 8.4–10.2)
CHLORIDE SERPL-SCNC: 106 MMOL/L (ref 98–107)
CHOLEST SERPL-MCNC: 211 MG/DL
CHOLEST/HDLC SERPL: 3 {RATIO} (ref 0–5)
CLARITY UR: ABNORMAL
CO2 SERPL-SCNC: 22 MMOL/L (ref 22–29)
COLOR UR AUTO: YELLOW
CREAT SERPL-MCNC: 0.81 MG/DL (ref 0.55–1.02)
CREAT/UREA NIT SERPL: 18
EOSINOPHIL # BLD AUTO: 0.06 X10(3)/MCL (ref 0–0.9)
EOSINOPHIL NFR BLD AUTO: 0.9 %
ERYTHROCYTE [DISTWIDTH] IN BLOOD BY AUTOMATED COUNT: 14.3 % (ref 11.5–17)
EST. AVERAGE GLUCOSE BLD GHB EST-MCNC: 96.8 MG/DL
GFR SERPLBLD CREATININE-BSD FMLA CKD-EPI: >60 ML/MIN/1.73/M2
GLOBULIN SER-MCNC: 3.2 GM/DL (ref 2.4–3.5)
GLUCOSE SERPL-MCNC: 100 MG/DL (ref 74–100)
GLUCOSE UR QL STRIP: NORMAL
HBA1C MFR BLD: 5 %
HCT VFR BLD AUTO: 41.9 % (ref 37–47)
HDLC SERPL-MCNC: 67 MG/DL (ref 35–60)
HGB BLD-MCNC: 13.7 G/DL (ref 12–16)
HGB UR QL STRIP: ABNORMAL
HYALINE CASTS #/AREA URNS LPF: ABNORMAL /LPF
IMM GRANULOCYTES # BLD AUTO: 0 X10(3)/MCL (ref 0–0.04)
IMM GRANULOCYTES NFR BLD AUTO: 0 %
KETONES UR QL STRIP: NEGATIVE
LDLC SERPL CALC-MCNC: 124 MG/DL (ref 50–140)
LEUKOCYTE ESTERASE UR QL STRIP: 500
LYMPHOCYTES # BLD AUTO: 1.61 X10(3)/MCL (ref 0.6–4.6)
LYMPHOCYTES NFR BLD AUTO: 23.6 %
MCH RBC QN AUTO: 30 PG (ref 27–31)
MCHC RBC AUTO-ENTMCNC: 32.7 G/DL (ref 33–36)
MCV RBC AUTO: 91.9 FL (ref 80–94)
MONOCYTES # BLD AUTO: 0.7 X10(3)/MCL (ref 0.1–1.3)
MONOCYTES NFR BLD AUTO: 10.3 %
MUCOUS THREADS URNS QL MICRO: ABNORMAL /LPF
NEUTROPHILS # BLD AUTO: 4.4 X10(3)/MCL (ref 2.1–9.2)
NEUTROPHILS NFR BLD AUTO: 64.6 %
NITRITE UR QL STRIP: NEGATIVE
NRBC BLD AUTO-RTO: 0 %
PH UR STRIP: 6 [PH]
PLATELET # BLD AUTO: 307 X10(3)/MCL (ref 130–400)
PMV BLD AUTO: 11.6 FL (ref 7.4–10.4)
POTASSIUM SERPL-SCNC: 4.5 MMOL/L (ref 3.5–5.1)
PROT SERPL-MCNC: 7.1 GM/DL (ref 6.4–8.3)
PROT UR QL STRIP: ABNORMAL
RBC # BLD AUTO: 4.56 X10(6)/MCL (ref 4.2–5.4)
RBC #/AREA URNS AUTO: ABNORMAL /HPF
SODIUM SERPL-SCNC: 137 MMOL/L (ref 136–145)
SP GR UR STRIP.AUTO: 1.03 (ref 1–1.03)
SQUAMOUS #/AREA URNS LPF: ABNORMAL /HPF
TRIGL SERPL-MCNC: 101 MG/DL (ref 37–140)
TSH SERPL-ACNC: 1.43 UIU/ML (ref 0.35–4.94)
UROBILINOGEN UR STRIP-ACNC: NORMAL
VLDLC SERPL CALC-MCNC: 20 MG/DL
WBC # BLD AUTO: 6.81 X10(3)/MCL (ref 4.5–11.5)
WBC #/AREA URNS AUTO: ABNORMAL /HPF

## 2025-07-14 PROCEDURE — 80061 LIPID PANEL: CPT

## 2025-07-14 PROCEDURE — 36415 COLL VENOUS BLD VENIPUNCTURE: CPT

## 2025-07-14 PROCEDURE — 80053 COMPREHEN METABOLIC PANEL: CPT

## 2025-07-14 PROCEDURE — 84443 ASSAY THYROID STIM HORMONE: CPT

## 2025-07-14 PROCEDURE — 83036 HEMOGLOBIN GLYCOSYLATED A1C: CPT

## 2025-07-14 PROCEDURE — 81001 URINALYSIS AUTO W/SCOPE: CPT

## 2025-07-14 PROCEDURE — 85025 COMPLETE CBC W/AUTO DIFF WBC: CPT

## 2025-07-16 ENCOUNTER — OFFICE VISIT (OUTPATIENT)
Dept: PRIMARY CARE CLINIC | Facility: CLINIC | Age: 40
End: 2025-07-16
Payer: COMMERCIAL

## 2025-07-16 VITALS
WEIGHT: 181 LBS | OXYGEN SATURATION: 98 % | SYSTOLIC BLOOD PRESSURE: 117 MMHG | BODY MASS INDEX: 29.09 KG/M2 | HEART RATE: 63 BPM | HEIGHT: 66 IN | DIASTOLIC BLOOD PRESSURE: 83 MMHG | TEMPERATURE: 98 F

## 2025-07-16 DIAGNOSIS — E66.3 OVERWEIGHT WITH BODY MASS INDEX (BMI) OF 29 TO 29.9 IN ADULT: ICD-10-CM

## 2025-07-16 DIAGNOSIS — R03.0 ELEVATED BP WITHOUT DIAGNOSIS OF HYPERTENSION: ICD-10-CM

## 2025-07-16 DIAGNOSIS — E78.49 OTHER HYPERLIPIDEMIA: ICD-10-CM

## 2025-07-16 DIAGNOSIS — I10 PRIMARY HYPERTENSION: ICD-10-CM

## 2025-07-16 DIAGNOSIS — R42 DIZZINESS: ICD-10-CM

## 2025-07-16 DIAGNOSIS — Z00.00 ENCOUNTER FOR WELLNESS EXAMINATION IN ADULT: Primary | ICD-10-CM

## 2025-07-16 DIAGNOSIS — R53.83 FATIGUE, UNSPECIFIED TYPE: ICD-10-CM

## 2025-07-16 RX ORDER — METOPROLOL SUCCINATE 25 MG/1
25 TABLET, EXTENDED RELEASE ORAL DAILY
Qty: 90 TABLET | Refills: 3 | Status: SHIPPED | OUTPATIENT
Start: 2025-07-16 | End: 2026-07-16

## 2025-07-16 NOTE — ASSESSMENT & PLAN NOTE
Patient still reports fatigue; she does not rest well secondary to 3-year-old sleeping with her.  Patient encouraged to get more rest.  Patient encouraged to lose weight.  Continue to walk regularly.

## 2025-07-16 NOTE — ASSESSMENT & PLAN NOTE
Patient presents for wellness examination.  She has been feeling well.    Patient reports fatigue; she does not rest well secondary to 3-year-old sleeping with her.   Patient encouraged to make healthy food choices and limit portions.  Patient encouraged to increase physical activity, exercise and lose weight.    Gyn: Dr. Carreon; last checkup 11/01/2024.  Patient had mammogram earlier this year.  Labs reviewed with patient.

## 2025-07-16 NOTE — ASSESSMENT & PLAN NOTE
Her blood pressure is well controlled; continue Toprol-XL.  Refills sent.    Limit sodium consumption.    Patient encouraged to lose weight.    Continue to walk regularly.